# Patient Record
Sex: MALE | Race: WHITE | NOT HISPANIC OR LATINO
[De-identification: names, ages, dates, MRNs, and addresses within clinical notes are randomized per-mention and may not be internally consistent; named-entity substitution may affect disease eponyms.]

---

## 2017-01-26 ENCOUNTER — APPOINTMENT (OUTPATIENT)
Dept: INTERNAL MEDICINE | Facility: CLINIC | Age: 27
End: 2017-01-26

## 2017-07-09 ENCOUNTER — TRANSCRIPTION ENCOUNTER (OUTPATIENT)
Age: 27
End: 2017-07-09

## 2020-10-07 ENCOUNTER — EMERGENCY (EMERGENCY)
Facility: HOSPITAL | Age: 30
LOS: 1 days | Discharge: HOME | End: 2020-10-07
Attending: EMERGENCY MEDICINE | Admitting: EMERGENCY MEDICINE
Payer: SELF-PAY

## 2020-10-07 VITALS
HEART RATE: 88 BPM | DIASTOLIC BLOOD PRESSURE: 86 MMHG | SYSTOLIC BLOOD PRESSURE: 124 MMHG | OXYGEN SATURATION: 100 % | RESPIRATION RATE: 16 BRPM | TEMPERATURE: 98 F | WEIGHT: 179.9 LBS | HEIGHT: 68 IN

## 2020-10-07 DIAGNOSIS — M79.641 PAIN IN RIGHT HAND: ICD-10-CM

## 2020-10-07 PROCEDURE — 73130 X-RAY EXAM OF HAND: CPT | Mod: 26,RT

## 2020-10-07 PROCEDURE — 99283 EMERGENCY DEPT VISIT LOW MDM: CPT

## 2020-10-07 PROCEDURE — 73110 X-RAY EXAM OF WRIST: CPT

## 2020-10-07 PROCEDURE — 73130 X-RAY EXAM OF HAND: CPT

## 2020-10-07 PROCEDURE — 73110 X-RAY EXAM OF WRIST: CPT | Mod: 26,RT

## 2020-10-07 RX ORDER — KETOROLAC TROMETHAMINE 30 MG/ML
15 SYRINGE (ML) INJECTION ONCE
Refills: 0 | Status: DISCONTINUED | OUTPATIENT
Start: 2020-10-07 | End: 2020-10-07

## 2020-10-07 RX ORDER — IBUPROFEN 200 MG
600 TABLET ORAL ONCE
Refills: 0 | Status: DISCONTINUED | OUTPATIENT
Start: 2020-10-07 | End: 2020-10-07

## 2020-10-07 NOTE — ED PROVIDER NOTE - PATIENT PORTAL LINK FT
You can access the FollowMyHealth Patient Portal offered by [facility] by registering at the following website: [email or web address]. By joining [employer]’s FollowMyHealth portal, you will also be able to view your health information using other applications (apps) compatible with our system.

## 2020-10-07 NOTE — ED PROVIDER NOTE - CARE PLAN
Assessment and plan of treatment:	29 M with no hx presenting for right hand pain s/p fall on hand. WIll r/o metacarpal fracture at this time. Likely contusion. No signs of wrist fracture or injury. will get XR and pain control at this time.   Principal Discharge DX:	Hand injury, right, initial encounter  Assessment and plan of treatment:	29 M with no hx presenting for right hand pain s/p fall on hand. WIll r/o metacarpal fracture at this time. Likely contusion. No signs of wrist fracture or injury. will get XR and pain control at this time.

## 2020-10-07 NOTE — ED PROVIDER NOTE - NSFOLLOWUPINSTRUCTIONS_ED_ALL_ED_FT
You were evaluated in the Emergency Department for right hand injury.  You were evaluated and examined by a physician, and you had xrays of your wrist and hand.       Based on your evaluation: right hand injury     There are no signs of emergency conditions requiring admission to the hospital on today's workup.  Based on the evaluation, a presumptive diagnosis was made, however, further evaluation may be required by your primary care physician or a specialist for a more definitive diagnosis.  Therefore, please follow-up as directed or return to the Emergency Department if your symptoms change or worsen.    We recommend that you:  1. See your primary care physician within the next 72 hours for follow up.  Bring a copy of your discharge paperwork (including any test results) to your doctor.  2. Please see the sports medicine clinic on tuesday for follow up. phone number .   3. Please use motrin/tylenol as needed for pain        *** Return immediately if you have inability to use your hand or worsening pain, or any other new/concerning symptoms. ***

## 2020-10-07 NOTE — ED PROVIDER NOTE - ATTENDING CONTRIBUTION TO CARE
I performed a history and physical exam of the patient and discussed their management with the resident/ACP/medical or PA student. I reviewed the resident/ACP/medical or PA student's note and agree with the documented findings and plan of care.  attn --see MDM
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
## 2020-10-07 NOTE — ED ADULT NURSE NOTE - CHIEF COMPLAINT QUOTE
right hand and wrist injury
VITAL SIGNS:  T(F): 98 (06-09-18 @ 05:40), Max: 98 (06-09-18 @ 04:59)  HR: 68 (06-09-18 @ 05:40) (68 - 80)  BP: 119/65 (06-09-18 @ 05:40) (119/65 - 132/72)  BP(mean): 85 (06-09-18 @ 05:40) (85 - 85)  RR: 16 (06-09-18 @ 05:40) (16 - 18)  SpO2: 100% (06-09-18 @ 05:40) (100% - 100%)    PHYSICAL EXAM:    Constitutional: WDWN resting comfortably in bed; NAD, non-diaphoretic  HEENT: NC/AT, PERRL, EOMI, anicteric sclera, no nasal discharge; uvula midline, no oropharyngeal erythema or exudates; MMM  Neck: supple; no JVD or thyromegaly  Respiratory: CTA B/L; no W/R/R, no retractions  Cardiac: +S1/S2; RRR; no M/R/G  Gastrointestinal: soft, NT/ND; no rebound or guarding  Extremities: WWP, no clubbing or cyanosis; no peripheral edema  Vascular: 2+ radial, femoral, DP/PT pulses B/L  Dermatologic: skin warm, dry and intact; no rashes, wounds, or scars  Neurologic: AAOx3; CNII-XII grossly intact; no focal deficits

## 2020-10-07 NOTE — ED PROVIDER NOTE - OBJECTIVE STATEMENT
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  no other injury when questioned.  no prior injury.  pt is RHdominant.  no numbness or paresthesia Patient is a 29 year old male with no hx presenting for right hand pain s/p falling on to ulnar aspect of hand and wrist x 30 minutes ago. Symptoms started suddenly s/p fall, have been constant and worsening, with no worsening or alleviating factors. No medications taken. Patient denies chest pain, sob, fever, chills, headache, nausea, emesis, diarrhea, abdominal pain, hematuria/dysuria or lower extremity swelling. No head trauma. No blood thinners. no alcohol use.       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  no other injury when questioned.  no prior injury.  pt is RHdominant.  no numbness or paresthesia

## 2020-10-07 NOTE — ED PROVIDER NOTE - PHYSICAL EXAMINATION
Attn - alert, nad, Right upper extrem - no deformity or ecchymosis.  elbow-, wrist - sl tender ulnar side without swelling.  min swelling ulnar side of hand over MC with tenderness, no rotation, sensation intact, MCP sl tender.  digits - NT, skin - intact. LUE: Good distal pulse. Sensation intact. no obvious deformity or bruising noted. Tenderness present over ulnar aspect of hand. No tenderness over wrist. No tenderness over anatomic snuff box.    Attn - alert, nad, Right upper extrem - no deformity or ecchymosis.  elbow-, wrist - sl tender ulnar side without swelling.  min swelling ulnar side of hand over MC with tenderness, no rotation, sensation intact, MCP sl tender.  digits - NT, skin - intact.

## 2020-10-07 NOTE — ED PROVIDER NOTE - PROGRESS NOTE DETAILS
XR reviewed. No fractures noted. Likely contusion at this time. hand wrapped in ace bandage. Patient stable for follow up with sports medicine clinic at this time. Patient verbalizes plan and strict return precautions given.

## 2020-10-07 NOTE — ED PROVIDER NOTE - PLAN OF CARE
29 M with no hx presenting for right hand pain s/p fall on hand. WIll r/o metacarpal fracture at this time. Likely contusion. No signs of wrist fracture or injury. will get XR and pain control at this time.

## 2020-10-13 ENCOUNTER — APPOINTMENT (OUTPATIENT)
Dept: ORTHOPEDIC SURGERY | Facility: CLINIC | Age: 30
End: 2020-10-13
Payer: OTHER MISCELLANEOUS

## 2020-10-13 VITALS — WEIGHT: 180 LBS | HEIGHT: 68 IN | BODY MASS INDEX: 27.28 KG/M2

## 2020-10-13 DIAGNOSIS — S60.211A CONTUSION OF RIGHT WRIST, INITIAL ENCOUNTER: ICD-10-CM

## 2020-10-13 DIAGNOSIS — G56.21 LESION OF ULNAR NERVE, RIGHT UPPER LIMB: ICD-10-CM

## 2020-10-13 PROCEDURE — 99203 OFFICE O/P NEW LOW 30 MIN: CPT
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
## 2020-10-13 NOTE — PHYSICAL EXAM
[de-identified] : Patient is WDWN, alert, and in no acute distress. Breathing is unlabored. He is grossly oriented to person, place, and time. \par \par Right elbow: No ecchymosis, FROM, Positive Tinels along cubital tunnel\par Right hand:Tender over base of 4th metacarpal\par Sensation is decreased along ring and little fingers\par ADQ/1st D.I. 5/5 [de-identified] : Xray of right hand obtained at [facility] on 10/07/2020 were reviewed and interpreted in office today 10/13/2020 by Dr. Lucas and revealed no acute fractures or dislocations. No advanced arthritic changes. Signed by MONICA CLINTON M.D., ATTENDING RADIOLOGIST. \par \par Xray of right wrist obtained at [facility] on 10/07/2020 were reviewed and interpreted in office today 10/13/2020 by Dr. Lucas and revealed no acute fractures or dislocations. No advanced arthritic changes. Signed by MONICA CLINTON M.D., ATTENDING RADIOLOGIST.

## 2020-10-13 NOTE — DISCUSSION/SUMMARY
[de-identified] : Patient is advised to not rest the elbow on hard surfaces. \par Patient is advised to use a wrist brace. \par The patient was advised to soak the hand in warm water and Epsom salt. \par Patient was advised to continue with observation of his symptoms. \par NSAIDs as tolerated. \par Follow Up in 4 weeks or as needed. \par

## 2020-10-13 NOTE — RETURN TO WORK/SCHOOL
[FreeTextEntry1] : Mr. ARMANDO GALICIA was seen in the office today on 10/13/2020 and evaluated by me for an Orthopedic visit. \par \par Sincerely, \par Delonte Lucas MD

## 2020-10-13 NOTE — ADDENDUM
[FreeTextEntry1] : I, Ivory Caban wrote this note acting as a scribe for Dr. Delonte Lucas on Oct 13, 2020.

## 2020-10-13 NOTE — END OF VISIT
[FreeTextEntry3] : I, Delonet Lucas MD, ordering physician, have read and attest that all the information, medical decision making and discharge instructions within are true and accurate.

## 2020-11-16 ENCOUNTER — APPOINTMENT (OUTPATIENT)
Dept: ORTHOPEDIC SURGERY | Facility: CLINIC | Age: 30
End: 2020-11-16

## 2021-12-06 NOTE — ED ADULT NURSE NOTE - FINAL NURSING ELECTRONIC SIGNATURE
Detail Level: Zone Treatment Number: 0 Fluence (Will Not Render If 0): 10 Fluence (Will Not Render If 0): 20 Cooling: DCD 40/30 Were Eye Shields Employed?: Yes Number Of Prepaid Treatments (Will Not Render If 0): 2 Render Post-Care In The Note: No Eye Shield Text: Given the treatment area eye shields were inserted prior to treatment. Spot Size: 18 mm Pre-Procedure: Prior to proceeding the treatment areas were cleaned and all present put on their eye protection. Shaving (Optional): The patient was shaved in the office prior to the procedure Laser Type: Nd:Yag 1064nm Pulse Duration (Include Units): 3 Fluence (Will Not Render If 0): 20 Post-Procedure Care: Immediate endpoint: perifollicular erythema and edema. Vaseline and ice applied. Post care reviewed with patient. Consent: Written consent obtained, risks reviewed including but not limited to crusting, scabbing, blistering, scarring, darker or lighter pigmentary change, paradoxical hair regrowth, incomplete removal of hair and infection. Location Override: sideburns, shoulders, back, buttocks, arms & hands Laser Type: Alexandrite 755nm Post-Care Instructions: I reviewed with the patient in detail post-care instructions. Patient should avoid sun for a minimum of 4 weeks before and after treatment. Tolerated Procedure (Optional): 10 out of 10 07-Oct-2020 12:32

## 2021-12-25 ENCOUNTER — TRANSCRIPTION ENCOUNTER (OUTPATIENT)
Age: 31
End: 2021-12-25

## 2022-06-01 ENCOUNTER — EMERGENCY (EMERGENCY)
Facility: HOSPITAL | Age: 32
LOS: 0 days | Discharge: HOME | End: 2022-06-01
Attending: EMERGENCY MEDICINE | Admitting: EMERGENCY MEDICINE
Payer: OTHER MISCELLANEOUS

## 2022-06-01 VITALS
SYSTOLIC BLOOD PRESSURE: 153 MMHG | RESPIRATION RATE: 18 BRPM | HEART RATE: 75 BPM | DIASTOLIC BLOOD PRESSURE: 90 MMHG | OXYGEN SATURATION: 100 %

## 2022-06-01 VITALS
HEART RATE: 78 BPM | TEMPERATURE: 96 F | RESPIRATION RATE: 18 BRPM | OXYGEN SATURATION: 100 % | WEIGHT: 169.98 LBS | DIASTOLIC BLOOD PRESSURE: 94 MMHG | HEIGHT: 68 IN | SYSTOLIC BLOOD PRESSURE: 154 MMHG

## 2022-06-01 DIAGNOSIS — M54.9 DORSALGIA, UNSPECIFIED: ICD-10-CM
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
## 2022-06-01 DIAGNOSIS — M54.16 RADICULOPATHY, LUMBAR REGION: ICD-10-CM
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
## 2022-06-01 DIAGNOSIS — R20.2 PARESTHESIA OF SKIN: ICD-10-CM
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
## 2022-06-01 DIAGNOSIS — M54.50 LOW BACK PAIN, UNSPECIFIED: ICD-10-CM

## 2022-06-01 PROCEDURE — 99284 EMERGENCY DEPT VISIT MOD MDM: CPT

## 2022-06-01 RX ORDER — IBUPROFEN 200 MG
600 TABLET ORAL ONCE
Refills: 0 | Status: COMPLETED | OUTPATIENT
Start: 2022-06-01 | End: 2022-06-01

## 2022-06-01 RX ADMIN — Medication 600 MILLIGRAM(S): at 16:58

## 2022-06-01 NOTE — ED PROVIDER NOTE - NSFOLLOWUPINSTRUCTIONS_ED_ALL_ED_FT
Lumbosacral Radiculopathy      Lumbosacral radiculopathy is a condition that involves the spinal nerves and nerve roots in the low back and bottom of the spine. The condition develops when these nerves and nerve roots move out of place or become inflamed and cause symptoms.      What are the causes?    This condition may be caused by:  •Pressure from a disk that bulges out of place (herniated disk). A disk is a plate of soft cartilage that separates bones in the spine.      •Disk changes that occur with age (disk degeneration).      •A narrowing of the bones of the lower back (spinal stenosis).      •A tumor.      •An infection.      •An injury that places sudden pressure on the disks that cushion the bones of your lower spine.        What increases the risk?    You are more likely to develop this condition if:  •You are a male who is 30–50 years old.      •You are a female who is 50–60 years old.      •You use improper technique when lifting things.      •You are overweight or live a sedentary lifestyle.      •You smoke.      •Your work requires frequent lifting.      •You do repetitive activities that strain the spine.        What are the signs or symptoms?     Symptoms of this condition include:  •Pain that goes down from your back into your legs (sciatica), usually on one side of the body. This is the most common symptom. The pain may be worse with sitting, coughing, or sneezing.      •Pain and numbness in your legs.      •Muscle weakness.      •Tingling.      •Loss of bladder control or bowel control.        How is this diagnosed?    This condition may be diagnosed based on:  •Your symptoms and medical history.      •A physical exam.      If the pain is lasting, you may have tests, such as:  •MRI scan.      •X-ray.      •CT scan.      •A type of X-ray used to examine the spinal canal after injecting a dye into your spine (myelogram).      •A test to measure how electrical impulses move through a nerve (nerve conduction study).        How is this treated?    Treatment may depend on the cause of the condition and may include:  •Working with a physical therapist.      •Taking pain medicine.      •Applying heat and ice to affected areas.      •Doing stretches to improve flexibility.      •Doing exercises to strengthen back muscles.      •Having chiropractic spinal manipulation.      •Using transcutaneous electrical nerve stimulation (TENS) therapy.      •Getting a steroid injection in the spine.      In some cases, no treatment is needed. If the condition is long-lasting (chronic), or if symptoms are severe, treatment may involve surgery or lifestyle changes, such as following a weight-loss plan.      Follow these instructions at home:    Activity     •Avoid bending and other activities that make the problem worse.    •Maintain a proper position when standing or sitting:   •When standing, keep your upper back and neck straight, with your shoulders pulled back. Avoid slouching.       •When sitting, keep your back straight and relax your shoulders. Do not round your shoulders or pull them backward.        • Do not sit or  one place for long periods of time.      •Take brief periods of rest throughout the day. This will reduce your pain. It is usually better to rest by lying down or standing, not sitting.      •When you are resting for longer periods, mix in some mild activity or stretching between periods of rest. This will help to prevent stiffness and pain.       •Get regular exercise. Ask your health care provider what activities are safe for you. If you were shown how to do any exercises or stretches, do them as directed by your health care provider.    • Do not lift anything that is heavier than 10 lb (4.5 kg) or the limit that you are told by your health care provider. Always use proper lifting technique, which includes:  •Bending your knees.       •Keeping the load close to your body.       •Avoiding twisting.         Managing pain   •If directed, put ice on the affected area:  •Put ice in a plastic bag.       •Place a towel between your skin and the bag.      •Leave the ice on for 20 minutes, 2–3 times a day.      •If directed, apply heat to the affected area as often as told by your health care provider. Use the heat source that your health care provider recommends, such as a moist heat pack or a heating pad.  •Place a towel between your skin and the heat source.       •Leave the heat on for 20–30 minutes.       •Remove the heat if your skin turns bright red. This is especially important if you are unable to feel pain, heat, or cold. You may have a greater risk of getting burned.        •Take over-the-counter and prescription medicines only as told by your health care provider.      General instructions     •Sleep on a firm mattress in a comfortable position. Try lying on your side with your knees slightly bent. If you lie on your back, put a pillow under your knees.      • Do not drive or use heavy machinery while taking prescription pain medicine.      •If your health care provider prescribed a diet or exercise program, follow it as directed.      •Keep all follow-up visits as told by your health care provider. This is important.        Contact a health care provider if:    •Your pain does not improve over time, even when taking pain medicines.        Get help right away if:    •You develop severe pain.      •Your pain suddenly gets worse.      •You develop increasing weakness in your legs.      •You lose the ability to control your bladder or bowel.      •You have difficulty walking or balancing.      •You have a fever.        Summary    •Lumbosacral radiculopathy is a condition that occurs when the spinal nerves and nerve roots in the lower part of the spine move out of place or become inflamed and cause symptoms.      •Symptoms include pain, numbness, and tingling that go down from your back into your legs (sciatica), muscle weakness, and loss of bladder control or bowel control.      •If directed, apply ice or heat to the affected area as told by your health care provider.      •Follow instructions about activity, rest, and proper lifting technique.      This information is not intended to replace advice given to you by your health care provider. Make sure you discuss any questions you have with your health care provider.    Back Pain    Back pain is very common in adults. The cause of back pain is rarely dangerous and the pain often gets better over time. The cause of your back pain may not be known and may include strain of muscles or ligaments, degeneration of the spinal disks, or arthritis. Occasionally the pain may radiate down your leg(s). Over-the-counter medicines to reduce pain and inflammation are often the most helpful. Stretching and remaining active frequently helps the healing process.     SEEK IMMEDIATE MEDICAL CARE IF YOU HAVE THE FOLLOWING SYMPTOMS: bowel or bladder control problems, unusual weakness or numbness in your arms or legs, nausea or vomiting, abdominal pain, fever, dizziness/lightheadedness.

## 2022-06-01 NOTE — ED PROVIDER NOTE - CARE PROVIDER_API CALL
Kim Asif)  Neurosurgery  [address] [place] [id number]  Phone: [phone]  Fax: [phone]  Follow Up Time:

## 2022-06-01 NOTE — ED PROVIDER NOTE - PATIENT PORTAL LINK FT
You can access the FollowMyHealth Patient Portal offered by [facility] by registering at the following website: [email or web address]. By joining [employer]’s FollowMyHealth portal, you will also be able to view your health information using other applications (apps) compatible with our system.

## 2022-06-01 NOTE — ED PROVIDER NOTE - CLINICAL SUMMARY MEDICAL DECISION MAKING FREE TEXT BOX
Pt hesitant to take meds.  Treated with 600 mg Motrin.  Offered muscle relaxers and steroid. Rec to cont NSAIDs, ice to area, Biofreeze or salan pas patches.  Pt will need to follow up as outpt with rehab and likely Neuro deb if symptoms persist. Strict return precautions discussed. Pt instructed to return if any worsening symptoms or concerns.  They verbalize understanding.
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
## 2022-06-01 NOTE — ED PROVIDER NOTE - NS ED ROS FT
Review of Systems:  	•	CONSTITUTIONAL - no fever, no diaphoresis, no chills  	•	SKIN - no rash  	•	HEMATOLOGIC - no bleeding, no bruising  	•	EYES - no eye pain, no blurry vision  	•	ENT - no change in hearing, no sore throat, no ear pain or tinnitus  	•	RESPIRATORY - no shortness of breath, no cough  	•	CARDIAC - no chest pain, no palpitations    	•	MUSCULOSKELETAL - back pain   	•	NEUROLOGIC - no weakness, no headache, no paresthesias, no LOC  	•	PSYCH - no anxiety, non suicidal, non homicidal, no hallucination, no depression

## 2022-06-01 NOTE — ED PROVIDER NOTE - NSFOLLOWUPCLINICS_GEN_ALL_ED_FT
[facility] ([facility])  Rehabilitation  [facility] [address] [place] [address]  Phone: [phone]  Fax:

## 2022-06-01 NOTE — ED PROVIDER NOTE - PHYSICAL EXAMINATION
--EXAM--  VITAL SIGNS: I have reviewed vs documented at present.  CONSTITUTIONAL: Well-developed; well-nourished; in no acute distress.   SKIN: Warm and dry, no acute rash.   HEAD: Normocephalic; atraumatic.    NECK: Supple; non tender.  CARD: S1, S2, Regular rate and rhythm.   RESP: No wheezes, rales or rhonchi.  lumbar spine no midline tenderness paraspinal tenderness   NEURO: Alert, oriented, grossly unremarkable. Strength 5/5 in all extremities. Sensation intact throughout.  PSYCH: Cooperative, appropriate.

## 2022-06-01 NOTE — ED ADULT NURSE NOTE - IN THE PAST 12 MONTHS HAVE YOU USED DRUGS OTHER THAN THOSE REQUIRED FOR MEDICAL REASON?
Tommie Smiley [facility]  1961  [id number]    Have you had any Chest Pain that is not new? - No    Have you had any Shortness of Breath - No       Have you had any dizziness - No  If Yes DO ORTHOSTATIC BP - when do you feel dizzy    How long does it last .   . Have you had any palpitations that are not new? - No    Is the patient on any of the following medications -   If Yes DO EKG - Needs done every 6 months    Do you have any edema - swelling in No      When did you have your last labs drawn   Where did you have them done   What doctor ordered     If we do not have these labs you are retrieve these labs for these providers!     Do you have a surgery or procedure scheduled in the near future - No     Ask patient if they want to sign [facility] for [facility] if they are not already signed up     Check to see if we have an E-MAIL on file for the patient     Check medication list thoroughly!!! AND RECONCILE OUTSIDE MEDICATIONS  If dose has changed change the entire order not just the MG  BE SURE TO ASK PATIENT IF THEY NEED MEDICATION REFILLS     At check out add to every patient's \"wrap up\" the following dot phrase AFTERHOURSEDUCATION and ensure we explain this to our patients No
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
## 2022-06-09 ENCOUNTER — OUTPATIENT (OUTPATIENT)
Dept: OUTPATIENT SERVICES | Facility: HOSPITAL | Age: 32
LOS: 1 days | End: 2022-06-09
Payer: SELF-PAY

## 2022-06-09 ENCOUNTER — APPOINTMENT (OUTPATIENT)
Dept: MRI IMAGING | Facility: CLINIC | Age: 32
End: 2022-06-09
Payer: OTHER MISCELLANEOUS

## 2022-06-09 DIAGNOSIS — Z00.8 ENCOUNTER FOR OTHER GENERAL EXAMINATION: ICD-10-CM

## 2022-06-09 PROCEDURE — 72148 MRI LUMBAR SPINE W/O DYE: CPT | Mod: 26

## 2022-06-09 PROCEDURE — 72148 MRI LUMBAR SPINE W/O DYE: CPT

## 2022-06-13 ENCOUNTER — APPOINTMENT (OUTPATIENT)
Dept: ORTHOPEDIC SURGERY | Facility: CLINIC | Age: 32
End: 2022-06-13
Payer: OTHER MISCELLANEOUS

## 2022-06-13 VITALS
WEIGHT: 180 LBS | SYSTOLIC BLOOD PRESSURE: 144 MMHG | BODY MASS INDEX: 27.28 KG/M2 | DIASTOLIC BLOOD PRESSURE: 85 MMHG | HEIGHT: 68 IN

## 2022-06-13 DIAGNOSIS — M54.9 DORSALGIA, UNSPECIFIED: ICD-10-CM

## 2022-06-13 PROCEDURE — 99214 OFFICE O/P EST MOD 30 MIN: CPT

## 2022-06-13 PROCEDURE — 99072 ADDL SUPL MATRL&STAF TM PHE: CPT

## 2022-06-13 PROCEDURE — 72110 X-RAY EXAM L-2 SPINE 4/>VWS: CPT

## 2022-06-13 RX ORDER — MELOXICAM 15 MG/1
15 TABLET ORAL
Qty: 14 | Refills: 0 | Status: ACTIVE | COMMUNITY
Start: 2022-06-13 | End: 1900-01-01
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
## 2022-06-13 NOTE — PHYSICAL EXAM
[de-identified] : Lumbar Physical Exam\par \par Gait - Normal\par \par Station - Normal\par \par Sagittal balance - Normal\par \par Compensatory mechanism? - None\par \par Heel walk - Normal\par \par Toe walk - Normal\par \par Reflexes\par Patellar - normal\par Gastroc - normal\par Clonus - No\par \par Hip Exam - Normal\par \par Straight leg raise - none\par \par Pulses - 2+ dp/pt\par \par Range of motion - normal\par \par Sensation \par Sensation intact to light touch in L1, L2, L3, L4, L5 and S1 dermatomes bilaterally\par \par Motor\par 	IP	Quad	HS	TA	Gastroc	EHL\par Right	5/5	5/5	5/5	5/5	5/5	5/5\par Left	5/5	5/5	5/5	5/5	5/5	5/5 [de-identified] : Lumbar radiographs\par No instability on flexion-extension radiographs\par Disc heights relatively well-maintained\par \par Lumbar MRI\par Small disc protrusion L5-S1 without any significant stenosis\par No areas of critical central or foraminal stenosis

## 2022-06-24 ENCOUNTER — NON-APPOINTMENT (OUTPATIENT)
Age: 32
End: 2022-06-24

## 2022-07-02 ENCOUNTER — NON-APPOINTMENT (OUTPATIENT)
Age: 32
End: 2022-07-02

## 2022-07-03 ENCOUNTER — NON-APPOINTMENT (OUTPATIENT)
Age: 32
End: 2022-07-03

## 2022-07-29 ENCOUNTER — APPOINTMENT (OUTPATIENT)
Dept: ORTHOPEDIC SURGERY | Facility: CLINIC | Age: 32
End: 2022-07-29

## 2022-08-29 NOTE — ED ADULT NURSE NOTE - CHPI ED NUR TIMING2
sudden onset OB Provider reported that the vagina was inspected and no foreign body was found/Laps, needles and instrument count was correct No difficulties

## 2022-11-02 ENCOUNTER — EMERGENCY (EMERGENCY)
Facility: HOSPITAL | Age: 32
LOS: 0 days | Discharge: HOME | End: 2022-11-02
Attending: EMERGENCY MEDICINE

## 2022-11-02 VITALS — WEIGHT: 175.05 LBS | HEIGHT: 68 IN

## 2022-11-02 VITALS
OXYGEN SATURATION: 98 % | SYSTOLIC BLOOD PRESSURE: 134 MMHG | DIASTOLIC BLOOD PRESSURE: 87 MMHG | TEMPERATURE: 98 F | HEART RATE: 91 BPM | RESPIRATION RATE: 18 BRPM

## 2022-11-02 PROCEDURE — 99284 EMERGENCY DEPT VISIT MOD MDM: CPT

## 2022-11-02 RX ORDER — IBUPROFEN 200 MG
600 TABLET ORAL ONCE
Refills: 0 | Status: COMPLETED | OUTPATIENT
Start: 2022-11-02 | End: 2022-11-02

## 2022-11-02 RX ADMIN — Medication 600 MILLIGRAM(S): at 11:46
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
## 2022-11-02 NOTE — ED PROVIDER NOTE - PATIENT PORTAL LINK FT
You can access the FollowMyHealth Patient Portal offered by [facility] by registering at the following website: [email or web address]. By joining [employer]’s FollowMyHealth portal, you will also be able to view your health information using other applications (apps) compatible with our system.

## 2022-11-02 NOTE — ED PROVIDER NOTE - CARE PROVIDER_API CALL
Janae Gregorio)  Neurosurgery  [address], [address]  [place] [address]  Phone: [phone]  Fax: [phone]  Follow Up Time:

## 2022-11-02 NOTE — ED PROVIDER NOTE - CLINICAL SUMMARY MEDICAL DECISION MAKING FREE TEXT BOX
patient improved here no loss of bladder bowel control no saddle anesthesia no fevers chills no history of IVDA he is able ambulate in the emergency department is not consistent with infectious cause specifically spinal epidural abscess no signs of cauda equina I will discharge with follow-up to his PMD we discussed indications to return
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
## 2023-11-30 NOTE — ED ADULT TRIAGE NOTE - NSWEIGHTCALCTOOLDRUG_GEN_A_CORE
For information on Fall & Injury Prevention, visit: [email or web address] OR  [email or web address] OR  https://www.cdc.gov/steadi/patient.html
 used

## 2024-09-28 ENCOUNTER — EMERGENCY (EMERGENCY)
Facility: HOSPITAL | Age: 34
LOS: 1 days | Discharge: HOME | End: 2024-09-28
Attending: STUDENT IN AN ORGANIZED HEALTH CARE EDUCATION/TRAINING PROGRAM | Admitting: STUDENT IN AN ORGANIZED HEALTH CARE EDUCATION/TRAINING PROGRAM
Payer: OTHER MISCELLANEOUS

## 2024-09-28 VITALS
HEART RATE: 68 BPM | SYSTOLIC BLOOD PRESSURE: 136 MMHG | TEMPERATURE: 98 F | OXYGEN SATURATION: 100 % | DIASTOLIC BLOOD PRESSURE: 79 MMHG | HEIGHT: 68 IN | RESPIRATION RATE: 16 BRPM | WEIGHT: 175.05 LBS

## 2024-09-28 PROCEDURE — 99283 EMERGENCY DEPT VISIT LOW MDM: CPT

## 2024-09-28 NOTE — ED ADULT NURSE NOTE - OBJECTIVE STATEMENT
pt seen and evaluated by MD Callejas. no nursing orders necessary for patient. DC papers provided by MD. no IV placed.  Safety maintained.
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
## 2024-09-28 NOTE — ED PROVIDER NOTE - PATIENT PORTAL LINK FT
You can access the FollowMyHealth Patient Portal offered by [facility] by registering at the following website: [email or web address]. By joining [facility]’s FollowMyHealth portal, you will also be able to view your health information using other applications (apps) compatible with our system.
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
## 2024-10-07 ENCOUNTER — APPOINTMENT (OUTPATIENT)
Age: 34
End: 2024-10-07
Payer: OTHER MISCELLANEOUS

## 2024-10-07 VITALS — WEIGHT: 174 LBS | BODY MASS INDEX: 26.37 KG/M2 | HEIGHT: 68 IN

## 2024-10-07 DIAGNOSIS — M54.9 DORSALGIA, UNSPECIFIED: ICD-10-CM

## 2024-10-07 PROCEDURE — 99214 OFFICE O/P EST MOD 30 MIN: CPT

## 2024-10-07 RX ORDER — MELOXICAM 15 MG/1
15 TABLET ORAL DAILY
Qty: 14 | Refills: 0 | Status: ACTIVE | COMMUNITY
Start: 2024-10-07 | End: 1900-01-01

## 2024-10-10 ENCOUNTER — APPOINTMENT (OUTPATIENT)
Dept: ORTHOPEDIC SURGERY | Facility: CLINIC | Age: 34
End: 2024-10-10
Payer: OTHER MISCELLANEOUS

## 2024-10-10 VITALS
OXYGEN SATURATION: 98 % | WEIGHT: 175 LBS | HEIGHT: 68 IN | SYSTOLIC BLOOD PRESSURE: 124 MMHG | DIASTOLIC BLOOD PRESSURE: 76 MMHG | HEART RATE: 70 BPM | BODY MASS INDEX: 26.52 KG/M2

## 2024-10-10 DIAGNOSIS — S16.1XXA STRAIN OF MUSCLE, FASCIA AND TENDON AT NECK LEVEL, INITIAL ENCOUNTER: ICD-10-CM

## 2024-10-10 DIAGNOSIS — M54.12 RADICULOPATHY, CERVICAL REGION: ICD-10-CM

## 2024-10-10 DIAGNOSIS — M25.511 PAIN IN RIGHT SHOULDER: ICD-10-CM

## 2024-10-10 DIAGNOSIS — S40.011A CONTUSION OF RIGHT SHOULDER, INITIAL ENCOUNTER: ICD-10-CM

## 2024-10-10 PROCEDURE — 73030 X-RAY EXAM OF SHOULDER: CPT | Mod: RT

## 2024-10-10 PROCEDURE — 72040 X-RAY EXAM NECK SPINE 2-3 VW: CPT

## 2024-10-10 PROCEDURE — 99213 OFFICE O/P EST LOW 20 MIN: CPT

## 2024-10-11 ENCOUNTER — OUTPATIENT (OUTPATIENT)
Dept: OUTPATIENT SERVICES | Facility: HOSPITAL | Age: 34
LOS: 1 days | End: 2024-10-11
Payer: COMMERCIAL

## 2024-10-11 ENCOUNTER — TRANSCRIPTION ENCOUNTER (OUTPATIENT)
Age: 34
End: 2024-10-11

## 2024-10-11 ENCOUNTER — APPOINTMENT (OUTPATIENT)
Dept: MRI IMAGING | Facility: CLINIC | Age: 34
End: 2024-10-11

## 2024-10-11 PROCEDURE — 72148 MRI LUMBAR SPINE W/O DYE: CPT | Mod: 26

## 2024-10-11 PROCEDURE — 72148 MRI LUMBAR SPINE W/O DYE: CPT

## 2024-10-16 DIAGNOSIS — M67.911 UNSPECIFIED DISORDER OF SYNOVIUM AND TENDON, RIGHT SHOULDER: ICD-10-CM

## 2024-10-19 ENCOUNTER — OUTPATIENT (OUTPATIENT)
Dept: OUTPATIENT SERVICES | Facility: HOSPITAL | Age: 34
LOS: 1 days | End: 2024-10-19
Payer: COMMERCIAL

## 2024-10-19 ENCOUNTER — APPOINTMENT (OUTPATIENT)
Dept: MRI IMAGING | Facility: CLINIC | Age: 34
End: 2024-10-19

## 2024-10-19 DIAGNOSIS — M67.911 UNSPECIFIED DISORDER OF SYNOVIUM AND TENDON, RIGHT SHOULDER: ICD-10-CM

## 2024-10-19 DIAGNOSIS — Z00.8 ENCOUNTER FOR OTHER GENERAL EXAMINATION: ICD-10-CM

## 2024-10-19 PROCEDURE — 73221 MRI JOINT UPR EXTREM W/O DYE: CPT | Mod: 26,RT

## 2024-10-19 PROCEDURE — 73221 MRI JOINT UPR EXTREM W/O DYE: CPT

## 2024-11-01 ENCOUNTER — APPOINTMENT (OUTPATIENT)
Dept: ORTHOPEDIC SURGERY | Facility: CLINIC | Age: 34
End: 2024-11-01
Payer: OTHER MISCELLANEOUS

## 2024-11-01 VITALS — WEIGHT: 174 LBS | HEIGHT: 68 IN | BODY MASS INDEX: 26.37 KG/M2

## 2024-11-01 DIAGNOSIS — M54.12 RADICULOPATHY, CERVICAL REGION: ICD-10-CM

## 2024-11-01 PROCEDURE — 99214 OFFICE O/P EST MOD 30 MIN: CPT

## 2024-11-20 ENCOUNTER — APPOINTMENT (OUTPATIENT)
Dept: ORTHOPEDIC SURGERY | Facility: CLINIC | Age: 34
End: 2024-11-20

## 2024-12-04 ENCOUNTER — APPOINTMENT (OUTPATIENT)
Dept: MRI IMAGING | Facility: CLINIC | Age: 34
End: 2024-12-04

## 2024-12-06 ENCOUNTER — APPOINTMENT (OUTPATIENT)
Dept: MRI IMAGING | Facility: CLINIC | Age: 34
End: 2024-12-06
Payer: OTHER MISCELLANEOUS

## 2024-12-06 ENCOUNTER — OUTPATIENT (OUTPATIENT)
Dept: OUTPATIENT SERVICES | Facility: HOSPITAL | Age: 34
LOS: 1 days | End: 2024-12-06
Payer: COMMERCIAL

## 2024-12-06 DIAGNOSIS — Z00.8 ENCOUNTER FOR OTHER GENERAL EXAMINATION: ICD-10-CM

## 2024-12-06 PROCEDURE — 72141 MRI NECK SPINE W/O DYE: CPT

## 2024-12-06 PROCEDURE — 72141 MRI NECK SPINE W/O DYE: CPT | Mod: 26

## 2024-12-11 ENCOUNTER — APPOINTMENT (OUTPATIENT)
Dept: ORTHOPEDIC SURGERY | Facility: CLINIC | Age: 34
End: 2024-12-11
Payer: OTHER MISCELLANEOUS

## 2024-12-11 DIAGNOSIS — M54.12 RADICULOPATHY, CERVICAL REGION: ICD-10-CM

## 2024-12-11 PROCEDURE — 99214 OFFICE O/P EST MOD 30 MIN: CPT

## 2025-02-26 NOTE — ED PROVIDER NOTE - EYES, MLM
Patient arrived to treatment suite for blood draw and Zometa infusion.  PIV started in left arm and blood drawn from site and sent to lab for processing.  Patient has no questions or concerns for the doctor at this time.  Treatment approved and given.  Patient tolerated well.  Left treatment suite ambulatory.  Discharge instructions provided.   Clear bilaterally, pupils equal, round and reactive to light.